# Patient Record
Sex: FEMALE | Race: WHITE | Employment: FULL TIME | URBAN - NONMETROPOLITAN AREA
[De-identification: names, ages, dates, MRNs, and addresses within clinical notes are randomized per-mention and may not be internally consistent; named-entity substitution may affect disease eponyms.]

---

## 2019-08-11 ENCOUNTER — HOSPITAL ENCOUNTER (EMERGENCY)
Facility: HOSPITAL | Age: 44
Discharge: HOME | End: 2019-08-11
Attending: NURSE PRACTITIONER | Admitting: NURSE PRACTITIONER
Payer: COMMERCIAL

## 2019-08-11 VITALS
OXYGEN SATURATION: 100 % | DIASTOLIC BLOOD PRESSURE: 74 MMHG | SYSTOLIC BLOOD PRESSURE: 113 MMHG | RESPIRATION RATE: 16 BRPM | TEMPERATURE: 98.8 F

## 2019-08-11 DIAGNOSIS — T63.301A ALLERGIC REACTION TO SPIDER BITE, ACCIDENTAL OR UNINTENTIONAL, INITIAL ENCOUNTER: ICD-10-CM

## 2019-08-11 PROCEDURE — 99202 OFFICE O/P NEW SF 15 MIN: CPT | Performed by: NURSE PRACTITIONER

## 2019-08-11 PROCEDURE — G0463 HOSPITAL OUTPT CLINIC VISIT: HCPCS

## 2019-08-11 ASSESSMENT — ENCOUNTER SYMPTOMS
COLOR CHANGE: 1
GASTROINTESTINAL NEGATIVE: 1
NEUROLOGICAL NEGATIVE: 1
CONSTITUTIONAL NEGATIVE: 1
RESPIRATORY NEGATIVE: 1

## 2019-08-11 NOTE — DISCHARGE INSTRUCTIONS
Zyrtec 10 mg in the am and 10 mg at night or you may use 25 to 50 mg of benadryl at night. Zantac 300 mg daily or 150 mg twice a day as needed. May use hydrocortisone creme or benadryl creme for itching. Do not use both benadryl creme and oral benadryl.   Watch for signs of infection, including fever, increased redness, purulent drainage from wound, and increased pain.

## 2019-08-11 NOTE — ED AVS SNAPSHOT
[facility]  [address] [address]  Phone:  [phone]                                    Lucille Zaman   MRN: [id number]    Department:  [facility]   Date of Visit:  8/11/2019           After Visit Summary Signature Page    I have received my discharge instructions, and my questions have been answered. I have discussed any challenges I see with this plan with the nurse or doctor.    ..........................................................................................................................................  Patient/Patient Representative Signature      ..........................................................................................................................................  Patient Representative Print Name and Relationship to Patient    ..................................................               ................................................  Date                                   Time    ..........................................................................................................................................  Reviewed by Signature/Title    ...................................................              ..............................................  Date                                               Time          22EPIC Rev 08/18

## 2019-08-11 NOTE — ED PROVIDER NOTES
History     Chief Complaint   Patient presents with     Insect Bite     right forearm redness     HPI  Lucille Zaman is a 44 year old female who is accompanied per her sister. They are camping here from [place]. She woke up this am with a itchy red humble on her right arm. She did not have it when she went to bed last night. She is concerned about a tick bite.  Denies fevers, chills, nausea, vomiting, diarrhea, and shortness of breath.      Allergies:  No Known Allergies    Problem List:    There are no active problems to display for this patient.       Past Medical History:    History reviewed. No pertinent past medical history.    Past Surgical History:    History reviewed. No pertinent surgical history.    Family History:    No family history on file.    Social History:  Marital Status:   [2]  Social History     Tobacco Use     Smoking status: None   Substance Use Topics     Alcohol use: None     Drug use: None        Medications:      No current outpatient medications on file.      Review of Systems   Constitutional: Negative.    Respiratory: Negative.    Gastrointestinal: Negative.    Skin: Positive for color change and rash.   Neurological: Negative.        Physical Exam   BP: 113/74  Heart Rate: 70  Temp: 98.8  F (37.1  C)  Resp: 16  SpO2: 100 %      Physical Exam   Constitutional: She appears well-developed and well-nourished. No distress.   Pulmonary/Chest: Effort normal.   Musculoskeletal:        Arms:  Skin: Rash noted. There is erythema.   Psychiatric: She has a normal mood and affect.   Nursing note and vitals reviewed.      ED Course        Procedures            No results found for this or any previous visit (from the past 24 hour(s)).    Medications - No data to display    Assessments & Plan (with Medical Decision Making)     I have reviewed the nursing notes.    I have reviewed the findings, diagnosis, plan and need for follow up with the patient.  Brown recluse spider bite on right  forearm. Treat symptomatically with ibuprofen, Zyrtec, benadryl, and Zantac. Watch for signs of infection. Discussed tick bites versus recluse spider bites and their treatments. Discharge instructions and medications reviewed with her and her sister and understanding verbalized.      New Prescriptions    No medications on file       Final diagnoses:   Allergic reaction to spider bite, accidental or unintentional, initial encounter       8/11/2019   [facility]     Kanwal Joseph, CNP  08/11/19 [id number]